# Patient Record
Sex: FEMALE | Race: WHITE | NOT HISPANIC OR LATINO | URBAN - METROPOLITAN AREA
[De-identification: names, ages, dates, MRNs, and addresses within clinical notes are randomized per-mention and may not be internally consistent; named-entity substitution may affect disease eponyms.]

---

## 2018-05-04 ENCOUNTER — EMERGENCY (EMERGENCY)
Facility: HOSPITAL | Age: 73
LOS: 1 days | Discharge: HOME | End: 2018-05-04
Attending: EMERGENCY MEDICINE | Admitting: EMERGENCY MEDICINE
Payer: MEDICARE

## 2018-05-04 VITALS
TEMPERATURE: 98 F | HEART RATE: 88 BPM | RESPIRATION RATE: 18 BRPM | SYSTOLIC BLOOD PRESSURE: 160 MMHG | OXYGEN SATURATION: 100 % | DIASTOLIC BLOOD PRESSURE: 89 MMHG

## 2018-05-04 PROCEDURE — 73140 X-RAY EXAM OF FINGER(S): CPT | Mod: 26,LT

## 2018-05-04 PROCEDURE — 99283 EMERGENCY DEPT VISIT LOW MDM: CPT

## 2018-05-04 RX ORDER — TETANUS TOXOID, REDUCED DIPHTHERIA TOXOID AND ACELLULAR PERTUSSIS VACCINE, ADSORBED 5; 2.5; 8; 8; 2.5 [IU]/.5ML; [IU]/.5ML; UG/.5ML; UG/.5ML; UG/.5ML
0.5 SUSPENSION INTRAMUSCULAR ONCE
Qty: 0 | Refills: 0 | Status: COMPLETED | OUTPATIENT
Start: 2018-05-04 | End: 2018-05-04

## 2018-05-04 RX ORDER — BACITRACIN ZINC 500 UNIT/G
1 OINTMENT IN PACKET (EA) TOPICAL ONCE
Qty: 0 | Refills: 0 | Status: COMPLETED | OUTPATIENT
Start: 2018-05-04 | End: 2018-05-04

## 2018-05-04 RX ADMIN — TETANUS TOXOID, REDUCED DIPHTHERIA TOXOID AND ACELLULAR PERTUSSIS VACCINE, ADSORBED 0.5 MILLILITER(S): 5; 2.5; 8; 8; 2.5 SUSPENSION INTRAMUSCULAR at 20:53

## 2018-05-04 RX ADMIN — Medication 1 APPLICATION(S): at 20:53

## 2018-05-04 NOTE — ED ADULT TRIAGE NOTE - CHIEF COMPLAINT QUOTE
left HAND injury, while moving the wood from her bed. was sent from urgent care for x-ray. FULL ROM,
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
## 2018-05-08 DIAGNOSIS — M79.645 PAIN IN LEFT FINGER(S): ICD-10-CM

## 2018-05-08 DIAGNOSIS — S67.193A CRUSHING INJURY OF LEFT MIDDLE FINGER, INITIAL ENCOUNTER: ICD-10-CM

## 2018-05-08 DIAGNOSIS — Z88.2 ALLERGY STATUS TO SULFONAMIDES: ICD-10-CM
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
## 2018-05-08 DIAGNOSIS — Z23 ENCOUNTER FOR IMMUNIZATION: ICD-10-CM

## 2018-12-28 NOTE — ED ADULT NURSE NOTE - NS ED NURSE LEVEL OF CONSCIOUSNESS MENTAL STATUS
Alert/Awake [Non-Contributory] : Non-contributory [Duration: ___ wks] : duration: [unfilled] weeks [Vaginal] : Vaginal [Normal?] : normal delivery [___ lbs.] : [unfilled] lbs [Was child in NICU?] : Child was not in NICU

## 2020-10-30 PROBLEM — Z00.00 ENCOUNTER FOR PREVENTIVE HEALTH EXAMINATION: Status: ACTIVE | Noted: 2020-10-30

## 2021-04-13 NOTE — ED PROVIDER NOTE - CPE EDP [facility] NORM
Last office visit: 2/9/2021  Next office visit: Visit date not found  Last refill: 9-3-2020  30 with 5 refills   Last labs: 1-    - - -

## 2021-10-04 ENCOUNTER — EMERGENCY (EMERGENCY)
Facility: HOSPITAL | Age: 76
LOS: 1 days | Discharge: HOME | End: 2021-10-04
Admitting: EMERGENCY MEDICINE
Payer: MEDICARE

## 2021-10-04 VITALS
RESPIRATION RATE: 15 BRPM | DIASTOLIC BLOOD PRESSURE: 84 MMHG | SYSTOLIC BLOOD PRESSURE: 163 MMHG | TEMPERATURE: 98 F | HEIGHT: 62 IN | WEIGHT: 126.99 LBS | HEART RATE: 79 BPM | OXYGEN SATURATION: 96 %

## 2021-10-04 DIAGNOSIS — R35.0 FREQUENCY OF MICTURITION: ICD-10-CM

## 2021-10-04 DIAGNOSIS — N39.0 URINARY TRACT INFECTION, SITE NOT SPECIFIED: ICD-10-CM

## 2021-10-04 DIAGNOSIS — Z88.2 ALLERGY STATUS TO SULFONAMIDES: ICD-10-CM

## 2021-10-04 DIAGNOSIS — E11.9 TYPE 2 DIABETES MELLITUS WITHOUT COMPLICATIONS: ICD-10-CM

## 2021-10-04 PROCEDURE — 99283 EMERGENCY DEPT VISIT LOW MDM: CPT

## 2021-10-04 NOTE — ED PROVIDER NOTE - NSFOLLOWUPINSTRUCTIONS_ED_ALL_ED_FT
1. Return to the ED if new or worsening symptoms  2. Follow up with your OBGYN as needed.    Urinary Tract Infection    A urinary tract infection (UTI) is an infection of any part of the urinary tract, which includes the kidneys, ureters, bladder, and urethra. Risk factors include ignoring your need to urinate, wiping back to front if female, being an uncircumcised male, and having diabetes or a weak immune system. Symptoms include frequent urination, pain or burning with urination, foul smelling urine, cloudy urine, pain in the lower abdomen, blood in the urine, and fever. If you were prescribed an antibiotic medicine, take it as told by your health care provider. Do not stop taking the antibiotic even if you start to feel better.    SEEK IMMEDIATE MEDICAL CARE IF YOU HAVE ANY OF THE FOLLOWING SYMPTOMS: severe back or abdominal pain, fever, inability to keep fluids or medicine down, dizziness/lightheadedness, or a change in mental status.

## 2021-10-04 NOTE — ED ADULT NURSE NOTE - NSIMPLEMENTINTERV_GEN_ALL_ED
Implemented All Universal Safety Interventions:  [place] to call system. Call bell, personal items and telephone within reach. Instruct patient to call for assistance. Room bathroom lighting operational. Non-slip footwear when patient is off stretcher. Physically safe environment: no spills, clutter or unnecessary equipment. Stretcher in lowest position, wheels locked, appropriate side rails in place.

## 2021-10-04 NOTE — ED PROVIDER NOTE - PATIENT PORTAL LINK FT
You can access the FollowMyHealth Patient Portal offered by [facility] by registering at the following website: [email or web address]. By joining [employer]’s FollowMyHealth portal, you will also be able to view your health information using other applications (apps) compatible with our system.

## 2021-10-04 NOTE — ED PROVIDER NOTE - CLINICAL SUMMARY MEDICAL DECISION MAKING FREE TEXT BOX
urinary frequency & urgency x 2 days, h/o UTIs. UA + Nitrite, pending culture. Pt on Macrobid PO from home, will f/u U.culture

## 2021-10-04 NOTE — ED PROVIDER NOTE - NSICDXPASTMEDICALHX_GEN_ALL_CORE_FT
PAST MEDICAL HISTORY:  No pertinent past medical history      PAST MEDICAL HISTORY:  Diabetes mellitus

## 2021-10-04 NOTE — ED PROVIDER NOTE - PROGRESS NOTE DETAILS
Patient has Macrobid antibiotics, exp 2022 and states the antibiotics work best for her. I advised her to take these antibiotics until the Urine culture results to assure she is getting proper coverage. patient understands and agrees to plan.

## 2021-10-04 NOTE — ED PROVIDER NOTE - OBJECTIVE STATEMENT
74 yo F h/o DM, presents with urinary frequency & urgency x 2 days. patient states it feels like her usual symptoms for UTI but wants to get checked out before she starts her Macrobid antibiotics (patient has pills already from her OBGYN).     (-) fevers, chill, nausea, vomiting, back pain, vaginal discharge or bleeding.

## 2022-01-07 PROBLEM — E11.9 TYPE 2 DIABETES MELLITUS WITHOUT COMPLICATIONS: Chronic | Status: ACTIVE | Noted: 2021-10-04

## 2022-02-02 ENCOUNTER — APPOINTMENT (OUTPATIENT)
Dept: ORTHOPEDIC SURGERY | Facility: CLINIC | Age: 77
End: 2022-02-02
Payer: MEDICARE

## 2022-02-02 DIAGNOSIS — Z78.9 OTHER SPECIFIED HEALTH STATUS: ICD-10-CM

## 2022-02-02 DIAGNOSIS — Z87.39 PERSONAL HISTORY OF OTHER DISEASES OF THE MUSCULOSKELETAL SYSTEM AND CONNECTIVE TISSUE: ICD-10-CM

## 2022-02-02 DIAGNOSIS — E11.9 TYPE 2 DIABETES MELLITUS W/OUT COMPLICATIONS: ICD-10-CM

## 2022-02-02 PROCEDURE — 99204 OFFICE O/P NEW MOD 45 MIN: CPT

## 2022-02-04 PROBLEM — Z87.39 HISTORY OF OSTEOPENIA: Status: RESOLVED | Noted: 2022-02-04 | Resolved: 2022-02-04

## 2022-02-04 PROBLEM — Z78.9 NON-SMOKER: Status: ACTIVE | Noted: 2022-02-04

## 2022-02-04 RX ORDER — RISEDRONATE SODIUM 30.1; 4.9 MG/1; MG/1
35 TABLET, FILM COATED ORAL
Refills: 0 | Status: ACTIVE | COMMUNITY

## 2022-02-04 RX ORDER — LOSARTAN POTASSIUM 25 MG/1
25 TABLET, FILM COATED ORAL
Refills: 0 | Status: ACTIVE | COMMUNITY

## 2022-02-04 RX ORDER — ESTRADIOL 0.1 MG/G
0.1 CREAM VAGINAL
Refills: 0 | Status: ACTIVE | COMMUNITY

## 2022-02-04 RX ORDER — METFORMIN HYDROCHLORIDE 500 MG/1
500 TABLET, COATED ORAL
Refills: 0 | Status: ACTIVE | COMMUNITY

## 2022-02-04 RX ORDER — INSULIN GLARGINE 100 [IU]/ML
INJECTION, SOLUTION SUBCUTANEOUS
Refills: 0 | Status: ACTIVE | COMMUNITY

## 2022-02-04 RX ORDER — INSULIN LISPRO 100 [IU]/ML
INJECTION, SOLUTION INTRAVENOUS; SUBCUTANEOUS
Refills: 0 | Status: ACTIVE | COMMUNITY

## 2022-02-04 RX ORDER — NAPROXEN 500 MG/1
500 TABLET ORAL
Refills: 0 | Status: ACTIVE | COMMUNITY

## 2022-02-04 RX ORDER — SIMVASTATIN 20 MG/1
20 TABLET, FILM COATED ORAL
Refills: 0 | Status: ACTIVE | COMMUNITY

## 2022-02-08 NOTE — PHYSICAL EXAM
[de-identified] : Physical Exam:\par \par General: patient is well developed, well nourished, in no acute distress, alert and oriented x3.\par \par Mood and affect: normal\par \par Respiratory: no respiratory distress noted\par \par Skin: no scars over spine, skin intact, no erythema, increased warmth\par \par Alignment: The spine is well compensated in the coronal and sagittal plane\par \par Gait: The patient is able to toe walk and heel walk without difficulty. The patient is able to tandem gait without difficulty.\par \par Palpation: no tenderness to palpation spine or paraspinal region\par \par Range of motion: lumbar spine ROM is full\par \par Neurological Exam:\par Motor: Manual muscle testing in the upper and lower extremities is 5 out of 5 in all muscle groups. There is no evidence of muscular atrophy in the upper extremities\par Sensory: Sensation to light touch is grossly intact in the upper and lower extremities\par \par Reflexes: DTR are present and symmetric throughout, no clonus, plantar responses are flexor\par \par Hip exam: No pain with internal or external rotation of hips bilaterally\par \par Special tests: Straight leg raise test negative. Cross straight leg test negative. OSKAR test negative.\par \par Vascular: Examination of the peripheral vascular system demonstrates no evidence of congestion or edema. no lymphedema bilateral lower extremities, pulses are present and symmetric in both lower extremities.  [de-identified] : 1/4/22 MRI: severe stenosis central and lateral recess at L4-5, severe neuroforaminal stenosis bilaterally\par \par 1/4/22 CT: L4-5 grade 2 spondylolisthesis with vacuum phenomenon at L4-5 disc, severe disc height loss, pars are thin though appear intact, severe stenosis seen again at L4-5 level in the canal and in the foramen\par \par 1/4/22 quantitative CT lumbar spine: average score of 86.7 which correlates to T score of -3.14, the  range is indicative of osteopenia as per QCT though the T score given suggested that she may be borderline osteoporotic\par \par 1/3/22 lumbar flexion extension: grade 2 spondylolisthesis at L4-5, severe disc height loss, does not appear to be any significant translation between flexion extension though the patient is not seem to be changing her spine shape much in these images\par \par 1/3/22 XR full spine: mild scoliosis, well aligned globally, grade 2 spondylolisthesis of L4-5 with severe height loss, overall well maintained lordosis and well maintained global sagittal alignment, other discs appear normal

## 2022-02-08 NOTE — HISTORY OF PRESENT ILLNESS
[de-identified] : Ms. THIBODEAUX is a very pleasant 76 year old female who complains of bilateral lower extremity radicular pain. She sleeps in a loft bed and climbs down a ladder when she wakes up. She can hardly walk secondary to lower extremity pain after waking. Pain improves throughout the day. Also reports lower extremity paresthesias in the mornings. She takes Naproxen 500 mg BID with improvement. She had an ALONSO which gave her relief for 1.5 months. h/o DM type 2, she reports that it is not well controlled and her A1C is around 8. Denies any other medical issues. \par \par The patient reports no loss of hand dexterity.\par The patient states there is no loss of balance when walking.\par There is no sensory loss in the arms or legs.\par The patient reports no difficulty with urination.\par \par The patient reports no history of previous spine surgery.\par \par The patient has no history of unexpected weight loss, no history of active cancer, no history of bladder or bowel dysfunction, no night pain, no fever or chills. \par \par The past medical history, surgical history, family history, allergies, medications, review of systems, family history and social history were reviewed and noncontributory.

## 2022-02-08 NOTE — DISCUSSION/SUMMARY
[de-identified] : Diagnosis: grade 2 spondylolisthesis with severe central and lateral recess and foraminal stenosis with lumbar radiculopathy and neurogenic claudication\par \par Plan:\par Discussed my findings with the patient. Ms. Mayo has been suffering from chronic lower extremity radicular pain. Discussed findings on imaging corresponds to the symptoms she has been experiencing. She has failed conservative management, including oral medication, physical therapy, and steroid injections. Discussed surgical intervention would involve an L4-5 laminectomy, posterior fusion with instrumentation, tlif. However, patient not currently a candidate for elective spine surgery due to her A1c of 8.0. Explained importance of tighter glucose control and perioperative medical risks and increased risks of poor wound healing and infection. FU in 6-8 weeks for repeat exam and to check on her glucose level and symptoms, sooner if there is an issue. Answered all questions.

## 2022-02-08 NOTE — ADDENDUM
[FreeTextEntry1] : Documented by Maggie Short acting as scribe for Dr. Baker on 02/02/2022 \par \par All Medical record entries made by the Scribe were at my, Dr. Baker's, direction and personally dictated by me on 02/02/2022. I have reviewed the chart and agree that the record accurately reflects my personal performance of the history, physical exam, assessment and plan. I have also personally directed, reviewed, and agreed with the discharge instructions.

## 2022-03-16 ENCOUNTER — APPOINTMENT (OUTPATIENT)
Dept: ORTHOPEDIC SURGERY | Facility: CLINIC | Age: 77
End: 2022-03-16
Payer: MEDICARE

## 2022-03-16 VITALS
SYSTOLIC BLOOD PRESSURE: 109 MMHG | DIASTOLIC BLOOD PRESSURE: 67 MMHG | HEART RATE: 87 BPM | OXYGEN SATURATION: 97 % | WEIGHT: 126 LBS | HEIGHT: 62 IN | BODY MASS INDEX: 23.19 KG/M2

## 2022-03-16 DIAGNOSIS — M48.061 SPINAL STENOSIS, LUMBAR REGION WITHOUT NEUROGENIC CLAUDICATION: ICD-10-CM

## 2022-03-16 PROCEDURE — 99214 OFFICE O/P EST MOD 30 MIN: CPT

## 2022-03-17 PROBLEM — M48.061 LUMBAR STENOSIS: Status: ACTIVE | Noted: 2022-02-04

## 2022-03-17 NOTE — DISCUSSION/SUMMARY
[de-identified] : Diagnosis: grade 2 spondylolisthesis L4-5 with spinal stenosis\par \par Discussed my findings with the patient. At this point Kassy is not having any pain. She is taking 250 mg Naproxen BID. She is not having any weakness or numbness. I have advised Kassy to ween off her Naproxen and see how she feels. She will continue to work on her sugar control. If her pain returns as she weens her Naproxen then we will plan for surgery. However at this time with her being asymptomatic I would not recommend a major surgical intervention at this time. All questions answered.

## 2022-03-17 NOTE — HISTORY OF PRESENT ILLNESS
[de-identified] : Follow up 3/16/22: Patient presents today for a follow up. She denies any changes to her symptoms since she was last seen. She has been taking Naproxen BID with improvement to her pain. She has very little pain currently. Her last A1C was 7.9. Her numbers are improving. \par \par Initial 2/2/22: Ms. THIBODEAUX is a very pleasant 76 year old female who complains of bilateral lower extremity radicular pain. She sleeps in a loft bed and climbs down a ladder when she wakes up. She can hardly walk secondary to lower extremity pain after waking. Pain improves throughout the day. Also reports lower extremity paresthesias in the mornings. She takes Naproxen 500 mg BID with improvement. She had an ALONSO which gave her relief for 1.5 months. h/o DM type 2, she reports that it is not well controlled and her A1C is around 8. Denies any other medical issues. \par \par The patient reports no loss of hand dexterity.\par The patient states there is no loss of balance when walking.\par There is no sensory loss in the arms or legs.\par The patient reports no difficulty with urination.\par \par The patient reports no history of previous spine surgery.\par \par The patient has no history of unexpected weight loss, no history of active cancer, no history of bladder or bowel dysfunction, no night pain, no fever or chills. \par \par The past medical history, surgical history, family history, allergies, medications, review of systems, family history and social history were reviewed and noncontributory.

## 2022-03-17 NOTE — ADDENDUM
[FreeTextEntry1] : Documented by Maggie Short acting as scribe for Dr. Baker on 03/16/2022 \par \par All Medical record entries made by the Scribe were at my, Dr. Baker's, direction and personally dictated by me on 03/16/2022. I have reviewed the chart and agree that the record accurately reflects my personal performance of the history, physical exam, assessment and plan. I have also personally directed, reviewed, and agreed with the discharge instructions.

## 2022-03-17 NOTE — PHYSICAL EXAM
[de-identified] : Physical Exam:  General: patient is well developed, well nourished, in no acute distress, alert and oriented x3.  Mood and affect: normal  Respiratory: no respiratory distress noted  Skin: no scars over spine, skin intact, no erythema, increased warmth  Alignment: The spine is well compensated in the coronal and sagittal plane  Gait: The patient is able to toe walk and heel walk without difficulty. The patient is able to tandem gait without difficulty.  Palpation: no tenderness to palpation spine or paraspinal region  Range of motion: lumbar spine ROM is full  Neurological Exam: Motor: Manual muscle testing in the upper and lower extremities is 5 out of 5 in all muscle groups. There is no evidence of muscular atrophy in the upper extremities Sensory: Sensation to light touch is grossly intact in the upper and lower extremities  Reflexes: DTR are present and symmetric throughout, no clonus, plantar responses are flexor  Hip exam: No pain with internal or external rotation of hips bilaterally  Special tests: Straight leg raise test negative. Cross straight leg test negative. OSKAR test negative.  Vascular: Examination of the peripheral vascular system demonstrates no evidence of congestion or edema. no lymphedema bilateral lower extremities, pulses are present and symmetric in both lower extremities. [de-identified] : 1/4/22 MRI: severe stenosis central and lateral recess at L4-5, severe neuroforaminal stenosis bilaterally\par \par 1/4/22 CT: L4-5 grade 2 spondylolisthesis with vacuum phenomenon at L4-5 disc, severe disc height loss, pars are thin though appear intact, severe stenosis seen again at L4-5 level in the canal and in the foramen\par \par 1/4/22 quantitative CT lumbar spine: average score of 86.7 which correlates to T score of -3.14, the  range is indicative of osteopenia as per QCT though the T score given suggested that she may be borderline osteoporotic\par \par 1/3/22 lumbar flexion extension: grade 2 spondylolisthesis at L4-5, severe disc height loss, does not appear to be any significant translation between flexion extension though the patient is not seem to be changing her spine shape much in these images\par \par 1/3/22 XR full spine: mild scoliosis, well aligned globally, grade 2 spondylolisthesis of L4-5 with severe height loss, overall well maintained lordosis and well maintained global sagittal alignment, other discs appear normal

## 2022-04-12 ENCOUNTER — APPOINTMENT (OUTPATIENT)
Dept: ORTHOPEDIC SURGERY | Facility: CLINIC | Age: 77
End: 2022-04-12
Payer: MEDICARE

## 2022-04-12 DIAGNOSIS — M43.16 SPONDYLOLISTHESIS, LUMBAR REGION: ICD-10-CM

## 2022-04-12 DIAGNOSIS — M54.16 RADICULOPATHY, LUMBAR REGION: ICD-10-CM

## 2022-04-12 PROCEDURE — 99443: CPT | Mod: 95

## 2022-04-12 NOTE — REASON FOR VISIT
[Home] : at home, [unfilled] , at the time of the visit. [Medical Office: ([facility])___] : at the medical office located in  [Verbal consent obtained from patient] : the patient, [unfilled] [Follow-Up Visit] : a follow-up visit for

## 2022-04-15 NOTE — HISTORY OF PRESENT ILLNESS
[de-identified] : Follow up 4/12/22: Patient presents for surgical discussion. Patient reports after 2-3 days off naproxen, low back pain increased to 7-8/10 severity. Went back on naproxen and pain now significantly improved. No new neurologic symptoms.

## 2022-04-15 NOTE — DISCUSSION/SUMMARY
[de-identified] : Discussed my findings with the patient. As patients symptoms are controlled with NSAID, would defer surgery at this time. Patient has osteoporosis on DEXA, discussed the importance of optimizing bone health and consultation with endocrinologist. If proceeded with surgical intervention, would likely recommend 3 months of forteo preoperatively. Discussed surgery would involve L4/L5 laminectomy, posterior spinal fusion with instrumentation, possible tlif, possible cemented screws. Patient inquired about TOPS procedure. Discussed she is not a candidate for TOPS due to osteoporosis and grade 2 anterolisthesis. Patient can continue naproxen if ok with PCP (should monitor BP and kidney function). Patient will follow up with me as needed if pain worsens and would like to consider surgical intervention. All questions answered.\par \par

## 2022-04-27 ENCOUNTER — APPOINTMENT (OUTPATIENT)
Dept: ORTHOPEDIC SURGERY | Facility: CLINIC | Age: 77
End: 2022-04-27

## 2023-02-15 ENCOUNTER — EMERGENCY (EMERGENCY)
Facility: HOSPITAL | Age: 78
LOS: 1 days | Discharge: HOME | End: 2023-02-15
Attending: EMERGENCY MEDICINE | Admitting: EMERGENCY MEDICINE
Payer: MEDICARE

## 2023-02-15 VITALS
DIASTOLIC BLOOD PRESSURE: 76 MMHG | HEIGHT: 63 IN | WEIGHT: 163.14 LBS | HEART RATE: 81 BPM | OXYGEN SATURATION: 95 % | RESPIRATION RATE: 16 BRPM | SYSTOLIC BLOOD PRESSURE: 161 MMHG

## 2023-02-15 VITALS
RESPIRATION RATE: 17 BRPM | TEMPERATURE: 98 F | DIASTOLIC BLOOD PRESSURE: 71 MMHG | HEART RATE: 74 BPM | OXYGEN SATURATION: 96 % | SYSTOLIC BLOOD PRESSURE: 147 MMHG

## 2023-02-15 DIAGNOSIS — Z88.2 ALLERGY STATUS TO SULFONAMIDES: ICD-10-CM

## 2023-02-15 DIAGNOSIS — Z79.4 LONG TERM (CURRENT) USE OF INSULIN: ICD-10-CM

## 2023-02-15 DIAGNOSIS — R41.82 ALTERED MENTAL STATUS, UNSPECIFIED: ICD-10-CM

## 2023-02-15 DIAGNOSIS — E11.649 TYPE 2 DIABETES MELLITUS WITH HYPOGLYCEMIA WITHOUT COMA: ICD-10-CM

## 2023-02-15 LAB
ALBUMIN SERPL ELPH-MCNC: 4.2 G/DL — SIGNIFICANT CHANGE UP (ref 3.4–5)
ALP SERPL-CCNC: 80 U/L — SIGNIFICANT CHANGE UP (ref 40–120)
ALT FLD-CCNC: 24 U/L — SIGNIFICANT CHANGE UP (ref 12–42)
ANION GAP SERPL CALC-SCNC: 11 MMOL/L — SIGNIFICANT CHANGE UP (ref 9–16)
AST SERPL-CCNC: 26 U/L — SIGNIFICANT CHANGE UP (ref 15–37)
BILIRUB SERPL-MCNC: 0.3 MG/DL — SIGNIFICANT CHANGE UP (ref 0.2–1.2)
BUN SERPL-MCNC: 25 MG/DL — HIGH (ref 7–23)
CALCIUM SERPL-MCNC: 9.4 MG/DL — SIGNIFICANT CHANGE UP (ref 8.5–10.5)
CHLORIDE SERPL-SCNC: 102 MMOL/L — SIGNIFICANT CHANGE UP (ref 96–108)
CO2 SERPL-SCNC: 26 MMOL/L — SIGNIFICANT CHANGE UP (ref 22–31)
CREAT SERPL-MCNC: 0.82 MG/DL — SIGNIFICANT CHANGE UP (ref 0.5–1.3)
EGFR: 74 ML/MIN/1.73M2 — SIGNIFICANT CHANGE UP
GLUCOSE BLDC GLUCOMTR-MCNC: 185 MG/DL — HIGH (ref 70–99)
GLUCOSE SERPL-MCNC: 148 MG/DL — HIGH (ref 70–99)
HCT VFR BLD CALC: 38.7 % — SIGNIFICANT CHANGE UP (ref 34.5–45)
HGB BLD-MCNC: 12.3 G/DL — SIGNIFICANT CHANGE UP (ref 11.5–15.5)
MCHC RBC-ENTMCNC: 29.9 PG — SIGNIFICANT CHANGE UP (ref 27–34)
MCHC RBC-ENTMCNC: 31.8 GM/DL — LOW (ref 32–36)
MCV RBC AUTO: 93.9 FL — SIGNIFICANT CHANGE UP (ref 80–100)
NRBC # BLD: 0 /100 WBCS — SIGNIFICANT CHANGE UP (ref 0–0)
PLATELET # BLD AUTO: 244 K/UL — SIGNIFICANT CHANGE UP (ref 150–400)
POTASSIUM SERPL-MCNC: 4.4 MMOL/L — SIGNIFICANT CHANGE UP (ref 3.5–5.3)
POTASSIUM SERPL-SCNC: 4.4 MMOL/L — SIGNIFICANT CHANGE UP (ref 3.5–5.3)
PROT SERPL-MCNC: 7.6 G/DL — SIGNIFICANT CHANGE UP (ref 6.4–8.2)
RBC # BLD: 4.12 M/UL — SIGNIFICANT CHANGE UP (ref 3.8–5.2)
RBC # FLD: 12.6 % — SIGNIFICANT CHANGE UP (ref 10.3–14.5)
SODIUM SERPL-SCNC: 139 MMOL/L — SIGNIFICANT CHANGE UP (ref 132–145)
WBC # BLD: 15.88 K/UL — HIGH (ref 3.8–10.5)
WBC # FLD AUTO: 15.88 K/UL — HIGH (ref 3.8–10.5)

## 2023-02-15 PROCEDURE — 99284 EMERGENCY DEPT VISIT MOD MDM: CPT

## 2023-02-15 NOTE — ED PROVIDER NOTE - PATIENT PORTAL LINK FT
You can access the FollowMyHealth Patient Portal offered by [facility] by registering at the following website: [email or web address]. By joining [employer]’s FollowMyHealth portal, you will also be able to view your health information using other applications (apps) compatible with our system.

## 2023-02-15 NOTE — ED PROVIDER NOTE - CARE PLAN
1 Principal Discharge DX:	Diabetes mellitus with hypoglycemia, with long-term current use of insulin

## 2023-02-15 NOTE — ED PROVIDER NOTE - CLINICAL SUMMARY MEDICAL DECISION MAKING FREE TEXT BOX
77 year old female p/w hypoglycemia secondary to insulin use and no food intake.  Patient ate her bean soup and was given cookies. Serial FSG wnl. Patient observed in ED with no acute events. Educated to importance of eat food after using insulin. Patient will follow up with her PMD within a week. Discharge and return to ED instructions given. 77 year old female p/w hypoglycemia secondary to insulin use and no food intake.  Patient ate her bean soup and was given cookies. Serial FSG wnl. Patient observed in ED with no acute events. Repeat FSG is 188. Educated to importance of eat food after using insulin. Patient will follow up with her PMD within a week. Discharge and return to ED instructions given.

## 2023-02-15 NOTE — ED ADULT TRIAGE NOTE - CHIEF COMPLAINT QUOTE
BIBA from [facility] for unconscious/AMS; found blood sugar to be 43 on scene; 18g IV established and given 1 amp d50 PTA; alert and oriented x3

## 2023-02-15 NOTE — ED PROVIDER NOTE - NSFOLLOWUPINSTRUCTIONS_ED_ALL_ED_FT
Hypoglycemia in a Person with Diabetes    WHAT YOU NEED TO KNOW:    What is hypoglycemia? Hypoglycemia is a serious condition that happens when your blood glucose (sugar) level drops too low. The blood sugar level is usually too high in a person with diabetes, but the level can also drop too low. It is important to follow your diabetes management plan to keep your blood sugar level steady.    What increases my risk for hypoglycemia?   •Binge eating, eating large amounts of carbohydrates in processed foods such as potato chips      •A missed meal, or a meal eaten later than usual      •Vomiting      •Certain medicines, including insulin or other diabetes medicine      •More exercise than usual, without extra food      •Alcohol use      •Pregnancy, older age      •Decreased liver or kidney function      •Not knowing your symptoms are symptoms of hypoglycemia      What are the signs and symptoms of hypoglycemia?   •Headache, hunger, or shakiness      •Trouble thinking or moodiness      •Sweating, or a pounding heartbeat      •Forgetfulness, confusion, or double vision      •Weakness or trouble walking      •Numbness and tingling around your mouth      •Seizures, coma, or loss of consciousness      How do I manage hypoglycemia?   •Check your blood sugar level right away if you have symptoms of hypoglycemia. Hypoglycemia usually happens when your blood sugar level is 70 mg/dL or below. Ask your diabetes care team provider what blood sugar level is too low for you.      •If your blood sugar level is too low, eat or drink 15 grams of fast-acting carbohydrate. Examples of this amount of fast-acting carbohydrate are 4 ounces (½ cup) of fruit juice or 4 ounces of regular soda. Other examples are 2 tablespoons of raisins or 1 tube of glucose gel.  Ways to Raise Your Blood Sugar     Check your blood sugar level 15 minutes later. Sit still as you wait. If the level is still low (less than 100 mg/dL), have another 15 grams of carbohydrate. When the level returns to 100 mg/dL, eat a meal if it is time. If your meal time is more than 1 hour away, eat a snack. The snack should contain carbohydrates, such as the following:?3/4 cup of cereal      ?1 cup of skim or low fat milk      ?6 soda crackers      ?1/2 of a turkey sandwich      ?15 fat-free chips  This will help prevent another drop in blood sugar. Always carefully follow your provider's instructions on how to treat low blood sugar levels.    •Always carry a source of fast-acting carbohydrate. If you have symptoms of hypoglycemia and you do not have a blood glucose meter, have a source of fast-acting carbohydrate anyway. Avoid carbohydrate foods that are high in fat. The fat content may make the carbohydrate take longer to increase your blood sugar level. Ask your provider if you should carry a glucagon kit. Glucagon is a medicine that is injected when you develop severe hypoglycemia and become unconscious. Check the expiration date every month and replace it before it expires.      •Teach others how to help you if you have symptoms of hypoglycemia. Tell them about the symptoms of hypoglycemia. Ask them to give you a source of fast-acting carbohydrate if you cannot get it yourself. Ask them to give you a glucagon injection if you have signs of hypoglycemia and you become unconscious or have a seizure. Ask them to call the local emergency number (911 in the ). This is an emergency. Tell them never to try to make you swallow anything if you faint or have a seizure.      •Wear medical alert jewelry or carry a card that says you have diabetes. Ask where to get these items.  Medical Alert Jewelry           How do I prevent hypoglycemia?   •Take diabetes medicine as directed. Take your medicine at the right time and in the right amount. Do not double the amount of medicine you take unless instructed by your diabetes care team provider. You may need oral diabetes medicine, insulin, or both to help control your blood sugar levels. Your healthcare provider will teach you how and when to take oral diabetes medicine. You will also be taught about side effects oral diabetes medicine can cause. Insulin may be added if oral diabetes medicine becomes less effective over time. Insulin may be injected, or given through a pump or pen. You and your care team will discuss which method is best for you.?An insulin pump is an implanted device that gives your insulin 24 hours a day. An insulin pump prevents the need for multiple insulin injections in a day.             ?An insulin pen is a device prefilled with the right amount of insulin.  Insulin Pen            •Eat meals and snacks as directed. Talk to your dietitian or provider about a meal plan that is right for you. Do not skip meals.  Plate Method           •Check your blood sugar level as directed. Ask your provider what your blood sugar levels should be before and after you eat. Ask when and how often to check your blood sugar level. You may need to check a drop of blood in a glucose test machine. You may need to check at least 3 times each day. Record your blood sugar level results and take the record with you when you see your care team. They may use it to make changes to your medicine, food, or exercise schedules. Your care team provider may recommend a continuous glucose monitor (CGM). A CGM is a device that is worn at all times. The CGM checks your blood sugar every 5 minutes. It sends results to an electronic device such as a smart phone.  How to check your blood sugar       Continuous Glucose Monitoring            •Check your blood sugar level before you exercise. Physical activity, such as exercise, can decrease your blood sugar level. If your blood sugar level is less than 100 mg/dL, have a carbohydrate snack. Examples are 4 to 6 crackers, ½ banana, 8 ounces (1 cup) of nonfat or 1% milk, or 4 ounces (½ cup) of juice. If you will be active for more than 1 hour, you may need to check your blood sugar level every 30 minutes. Your provider may also recommend that you check your blood sugar level after your activity.      •Know the risks if you choose to drink alcohol. Alcohol can cause your blood sugar levels to be low if you use insulin. Alcohol can cause high blood sugar levels and weight gain if you drink too much. Women 21 years or older and men 65 years or older should limit alcohol to 1 drink a day. Men aged 21 to 64 years should limit alcohol to 2 drinks a day. A drink of alcohol is 12 ounces of beer, 5 ounces of wine, or 1½ ounces of liquor.      Have someone call your local emergency number (911 in the [place]) if:   •You have a seizure or pass out.      •Your blood sugar is less than 50 mg/dL and does not respond to treatment.      •You feel you are going to pass out.      •You have trouble thinking clearly.      When should I call my doctor or diabetes care team provider?   •You have had symptoms of low blood sugar several times.      •You have questions about the amount of insulin or diabetes medicine you are taking.      •You have questions or concerns about your condition or care.      CARE AGREEMENT:    You have the right to help plan your care. Learn about your health condition and how it may be treated. Discuss treatment options with your healthcare providers to decide what care you want to receive. You always have the right to refuse treatment.

## 2023-05-10 ENCOUNTER — EMERGENCY (EMERGENCY)
Facility: HOSPITAL | Age: 78
LOS: 1 days | Discharge: HOME | End: 2023-05-10
Attending: EMERGENCY MEDICINE | Admitting: EMERGENCY MEDICINE
Payer: MEDICARE

## 2023-05-10 VITALS
HEART RATE: 83 BPM | OXYGEN SATURATION: 98 % | RESPIRATION RATE: 16 BRPM | DIASTOLIC BLOOD PRESSURE: 82 MMHG | TEMPERATURE: 98 F | SYSTOLIC BLOOD PRESSURE: 175 MMHG

## 2023-05-10 VITALS
HEART RATE: 76 BPM | DIASTOLIC BLOOD PRESSURE: 70 MMHG | TEMPERATURE: 98 F | SYSTOLIC BLOOD PRESSURE: 126 MMHG | OXYGEN SATURATION: 99 % | RESPIRATION RATE: 16 BRPM

## 2023-05-10 DIAGNOSIS — E11.649 TYPE 2 DIABETES MELLITUS WITH HYPOGLYCEMIA WITHOUT COMA: ICD-10-CM

## 2023-05-10 DIAGNOSIS — I10 ESSENTIAL (PRIMARY) HYPERTENSION: ICD-10-CM

## 2023-05-10 DIAGNOSIS — Z03.89 ENCOUNTER FOR OBSERVATION FOR OTHER SUSPECTED DISEASES AND CONDITIONS RULED OUT: ICD-10-CM

## 2023-05-10 DIAGNOSIS — Z79.4 LONG TERM (CURRENT) USE OF INSULIN: ICD-10-CM

## 2023-05-10 LAB
ALBUMIN SERPL ELPH-MCNC: 3.5 G/DL — SIGNIFICANT CHANGE UP (ref 3.4–5)
ALP SERPL-CCNC: 87 U/L — SIGNIFICANT CHANGE UP (ref 40–120)
ALT FLD-CCNC: 18 U/L — SIGNIFICANT CHANGE UP (ref 12–42)
ANION GAP SERPL CALC-SCNC: 5 MMOL/L — LOW (ref 9–16)
APPEARANCE UR: ABNORMAL
AST SERPL-CCNC: 23 U/L — SIGNIFICANT CHANGE UP (ref 15–37)
BACTERIA # UR AUTO: ABNORMAL /HPF
BASOPHILS # BLD AUTO: 0.08 K/UL — SIGNIFICANT CHANGE UP (ref 0–0.2)
BASOPHILS NFR BLD AUTO: 0.4 % — SIGNIFICANT CHANGE UP (ref 0–2)
BILIRUB SERPL-MCNC: 0.2 MG/DL — SIGNIFICANT CHANGE UP (ref 0.2–1.2)
BILIRUB UR-MCNC: NEGATIVE — SIGNIFICANT CHANGE UP
BUN SERPL-MCNC: 23 MG/DL — SIGNIFICANT CHANGE UP (ref 7–23)
CALCIUM SERPL-MCNC: 9.4 MG/DL — SIGNIFICANT CHANGE UP (ref 8.5–10.5)
CHLORIDE SERPL-SCNC: 105 MMOL/L — SIGNIFICANT CHANGE UP (ref 96–108)
CO2 SERPL-SCNC: 28 MMOL/L — SIGNIFICANT CHANGE UP (ref 22–31)
COLOR SPEC: YELLOW — SIGNIFICANT CHANGE UP
COMMENT - URINE: SIGNIFICANT CHANGE UP
CREAT SERPL-MCNC: 0.78 MG/DL — SIGNIFICANT CHANGE UP (ref 0.5–1.3)
DIFF PNL FLD: NEGATIVE — SIGNIFICANT CHANGE UP
EGFR: 78 ML/MIN/1.73M2 — SIGNIFICANT CHANGE UP
EOSINOPHIL # BLD AUTO: 0.11 K/UL — SIGNIFICANT CHANGE UP (ref 0–0.5)
EOSINOPHIL NFR BLD AUTO: 0.6 % — SIGNIFICANT CHANGE UP (ref 0–6)
EPI CELLS # UR: ABNORMAL /HPF (ref 0–5)
ETHANOL SERPL-MCNC: <3 MG/DL — SIGNIFICANT CHANGE UP
GLUCOSE BLDC GLUCOMTR-MCNC: 177 MG/DL — HIGH (ref 70–99)
GLUCOSE SERPL-MCNC: 176 MG/DL — HIGH (ref 70–99)
GLUCOSE UR QL: NEGATIVE — SIGNIFICANT CHANGE UP
HCT VFR BLD CALC: 36.2 % — SIGNIFICANT CHANGE UP (ref 34.5–45)
HGB BLD-MCNC: 11.5 G/DL — SIGNIFICANT CHANGE UP (ref 11.5–15.5)
HYALINE CASTS # UR AUTO: SIGNIFICANT CHANGE UP /LPF (ref 0–2)
IMM GRANULOCYTES NFR BLD AUTO: 0.5 % — SIGNIFICANT CHANGE UP (ref 0–0.9)
KETONES UR-MCNC: ABNORMAL MG/DL
LACTATE SERPL-SCNC: 1.4 MMOL/L — SIGNIFICANT CHANGE UP (ref 0.4–2)
LEUKOCYTE ESTERASE UR-ACNC: ABNORMAL
LYMPHOCYTES # BLD AUTO: 1.18 K/UL — SIGNIFICANT CHANGE UP (ref 1–3.3)
LYMPHOCYTES # BLD AUTO: 6.3 % — LOW (ref 13–44)
MAGNESIUM SERPL-MCNC: 2 MG/DL — SIGNIFICANT CHANGE UP (ref 1.6–2.6)
MCHC RBC-ENTMCNC: 29.9 PG — SIGNIFICANT CHANGE UP (ref 27–34)
MCHC RBC-ENTMCNC: 31.8 GM/DL — LOW (ref 32–36)
MCV RBC AUTO: 94 FL — SIGNIFICANT CHANGE UP (ref 80–100)
MONOCYTES # BLD AUTO: 1 K/UL — HIGH (ref 0–0.9)
MONOCYTES NFR BLD AUTO: 5.3 % — SIGNIFICANT CHANGE UP (ref 2–14)
NEUTROPHILS # BLD AUTO: 16.26 K/UL — HIGH (ref 1.8–7.4)
NEUTROPHILS NFR BLD AUTO: 86.9 % — HIGH (ref 43–77)
NITRITE UR-MCNC: NEGATIVE — SIGNIFICANT CHANGE UP
NRBC # BLD: 0 /100 WBCS — SIGNIFICANT CHANGE UP (ref 0–0)
PH UR: 6.5 — SIGNIFICANT CHANGE UP (ref 5–8)
PLATELET # BLD AUTO: 346 K/UL — SIGNIFICANT CHANGE UP (ref 150–400)
POTASSIUM SERPL-MCNC: 4.3 MMOL/L — SIGNIFICANT CHANGE UP (ref 3.5–5.3)
POTASSIUM SERPL-SCNC: 4.3 MMOL/L — SIGNIFICANT CHANGE UP (ref 3.5–5.3)
PROT SERPL-MCNC: 6.7 G/DL — SIGNIFICANT CHANGE UP (ref 6.4–8.2)
PROT UR-MCNC: NEGATIVE MG/DL — SIGNIFICANT CHANGE UP
RBC # BLD: 3.85 M/UL — SIGNIFICANT CHANGE UP (ref 3.8–5.2)
RBC # FLD: 12.8 % — SIGNIFICANT CHANGE UP (ref 10.3–14.5)
RBC CASTS # UR COMP ASSIST: < 5 /HPF — SIGNIFICANT CHANGE UP
SODIUM SERPL-SCNC: 138 MMOL/L — SIGNIFICANT CHANGE UP (ref 132–145)
SP GR SPEC: 1.02 — SIGNIFICANT CHANGE UP (ref 1–1.03)
UROBILINOGEN FLD QL: 0.2 E.U./DL — SIGNIFICANT CHANGE UP
WBC # BLD: 18.72 K/UL — HIGH (ref 3.8–10.5)
WBC # FLD AUTO: 18.72 K/UL — HIGH (ref 3.8–10.5)
WBC UR QL: > 10 /HPF

## 2023-05-10 PROCEDURE — 99284 EMERGENCY DEPT VISIT MOD MDM: CPT

## 2023-05-10 RX ORDER — CEFTRIAXONE 500 MG/1
1000 INJECTION, POWDER, FOR SOLUTION INTRAMUSCULAR; INTRAVENOUS ONCE
Refills: 0 | Status: COMPLETED | OUTPATIENT
Start: 2023-05-10 | End: 2023-05-10

## 2023-05-10 RX ORDER — SODIUM CHLORIDE 9 MG/ML
1000 INJECTION INTRAMUSCULAR; INTRAVENOUS; SUBCUTANEOUS ONCE
Refills: 0 | Status: COMPLETED | OUTPATIENT
Start: 2023-05-10 | End: 2023-05-10

## 2023-05-10 RX ORDER — CEFUROXIME AXETIL 250 MG
1 TABLET ORAL
Qty: 14 | Refills: 0
Start: 2023-05-10 | End: 2023-05-16

## 2023-05-10 RX ADMIN — SODIUM CHLORIDE 1000 MILLILITER(S): 9 INJECTION INTRAMUSCULAR; INTRAVENOUS; SUBCUTANEOUS at 04:03

## 2023-05-10 RX ADMIN — CEFTRIAXONE 100 MILLIGRAM(S): 500 INJECTION, POWDER, FOR SOLUTION INTRAMUSCULAR; INTRAVENOUS at 04:52

## 2023-05-10 NOTE — ED ADULT NURSE REASSESSMENT NOTE - NS ED NURSE REASSESS COMMENT FT1
Pt rpt feeling better after given ceftriaxone. Denies urinary symptoms at the time. BGL WNL. Gait steady.

## 2023-05-10 NOTE — ED PROVIDER NOTE - CLINICAL SUMMARY MEDICAL DECISION MAKING FREE TEXT BOX
Patient presents with isolated episode of hypoglycemia plan is to check all labs including kidney function electrolytes, urine, CBC.  She appears well she is afebrile we will feed in the emergency department as well as she skipped dinner.  Stable vital signs otherwise.  Patient has no focal symptoms at this time.

## 2023-05-10 NOTE — ED PROVIDER NOTE - PATIENT PORTAL LINK FT
You can access the FollowMyHealth Patient Portal offered by [facility] by registering at the following website: [email or web address]. By joining [facility]’s FollowMyHealth portal, you will also be able to view your health information using other applications (apps) compatible with our system.

## 2023-05-10 NOTE — ED PROVIDER NOTE - NSFOLLOWUPINSTRUCTIONS_ED_ALL_ED_FT
Hypoglycemia  Hypoglycemia occurs when the level of sugar (glucose) in the blood is too low. Hypoglycemia can happen in people who have or do not have diabetes. It can develop quickly, and it can be a medical emergency. For most people, a blood glucose level below 70 mg/dL (3.9 mmol/L) is considered hypoglycemia.    Glucose is a type of sugar that provides the body's main source of energy. Certain hormones (insulin and glucagon) control the level of glucose in the blood. Insulin lowers blood glucose, and glucagon raises blood glucose. Hypoglycemia can result from having too much insulin in the bloodstream, or from not eating enough food that contains glucose. You may also have reactive hypoglycemia, which happens within 4 hours after eating a meal.    What are the causes?  Hypoglycemia occurs most often in people who have diabetes and may be caused by:  Diabetes medicine.  Not eating enough, or not eating often enough.  Increased physical activity.  Drinking alcohol on an empty stomach.  If you do not have diabetes, hypoglycemia may be caused by:  A tumor in the pancreas.  Not eating enough, or not eating for long periods at a time (fasting).  A severe infection or illness.  Problems after having bariatric surgery.  Organ failure, such as kidney or liver failure.  Certain medicines.  What increases the risk?  Hypoglycemia is more likely to develop in people who:  Have diabetes and take medicines to lower blood glucose.  Abuse alcohol.  Have a severe illness.  What are the signs or symptoms?  Symptoms vary depending on whether the condition is mild, moderate, or severe.    Mild hypoglycemia    Hunger.  Sweating and feeling clammy.  Dizziness or feeling light-headed.  Sleepiness or restless sleep.  Nausea.  Increased heart rate.  Headache.  Blurry vision.  Mood changes, such as irritability or anxiety.  Tingling or numbness around the mouth, lips, or tongue.  Moderate hypoglycemia    Confusion and poor judgment.  Behavior changes.  Weakness.  Irregular heartbeat.  A change in coordination.  Severe hypoglycemia    Severe hypoglycemia is a medical emergency. It can cause:  Fainting.  Seizures.  Loss of consciousness (coma).  Death.  How is this diagnosed?  Hypoglycemia is diagnosed with a blood test to measure your blood glucose level. This blood test is done while you are having symptoms. Your health care provider may also do a physical exam and review your medical history.    How is this treated?  This condition can be treated by immediately eating or drinking something that contains sugar with 15 grams of fast-acting carbohydrate, such as:  4 oz (120 mL) of fruit juice.  4 oz (120 mL) of regular soda (not diet soda).  Several pieces of hard candy. Check food labels to find out how many pieces to eat for 15 grams.  1 Tbsp (15 mL) of sugar or honey.  4 glucose tablets.  1 tube of glucose gel.  Treating hypoglycemia if you have diabetes    Hands showing right hand using lancet pen on left ring finger, with glucometer in background.  If you are alert and able to swallow safely, follow the 15:15 rule:  Take 15 grams of a fast-acting carbohydrate. Talk with your health care provider about how much you should take. Options for getting 15 grams of fast-acting carbohydrate include:  Glucose tablets (take 4 tablets).  Several pieces of hard candy. Check food labels to find out how many pieces to eat for 15 grams.  4 oz (120 mL) of fruit juice.  4 oz (120 mL) of regular soda (not diet soda).  1 Tbsp (15 mL) of sugar or honey.  1 tube of glucose gel.  Check your blood glucose 15 minutes after you take the carbohydrate.  If the repeat blood glucose level is still at or below 70 mg/dL (3.9 mmol/L), take 15 grams of a carbohydrate again.  If your blood glucose level does not increase above 70 mg/dL (3.9 mmol/L) after 3 tries, seek emergency medical care.  After your blood glucose level returns to normal, eat a meal or a snack within 1 hour.  Treating severe hypoglycemia    Severe hypoglycemia is when your blood glucose level is below 54 mg/dL (3 mmol/L). Severe hypoglycemia is a medical emergency. Get medical help right away.    If you have severe hypoglycemia and you cannot eat or drink, you will need to be given glucagon. A family member or close friend should learn how to check your blood glucose and how to give you glucagon. Ask your health care provider if you need to have an emergency glucagon kit available.    Severe hypoglycemia may need to be treated in a hospital. The treatment may include getting glucose through an IV. You may also need treatment for the cause of your hypoglycemia.    Follow these instructions at home:  Medical alert bracelet.  General instructions    Take over-the-counter and prescription medicines only as told by your health care provider.  Monitor your blood glucose as told by your health care provider.  If you drink alcohol:  Limit how much you have to:  0–1 drink a day for women who are not pregnant.  0–2 drinks a day for men.  Know how much alcohol is in your drink. In the [place] one drink equals one 12 oz bottle of beer (355 mL), one 5 oz glass of wine (148 mL), or one 1½ oz glass of hard liquor (44 mL).  Be sure to eat food along with drinking alcohol.  Be aware that alcohol is absorbed quickly and may have lingering effects that may result in hypoglycemia later. Be sure to do ongoing glucose monitoring.  Keep all follow-up visits. This is important.  If you have diabetes:    Always have a fast-acting carbohydrate (15 grams) option with you to treat low blood glucose.  Follow your diabetes management plan as directed by your health care provider. Make sure you:  Know the symptoms of hypoglycemia. It is important to treat it right away to prevent it from becoming severe.  Check your blood glucose as often as told. Always check before and after exercise.  Always check your blood glucose before you drive a motorized vehicle.  Take your medicines as told.  Follow your meal plan. Eat on time, and do not skip meals.  Share your diabetes management plan with people in your workplace, school, and household.  Carry a medical alert card or wear medical alert jewelry.  Where to find more information  American Diabetes Association: www.diabetes.org  Contact a health care provider if:  You have problems keeping your blood glucose in your target range.  You have frequent episodes of hypoglycemia.  Get help right away if:  You continue to have hypoglycemia symptoms after eating or drinking something that contains 15 grams of fast-acting carbohydrate, and you cannot get your blood glucose above 70 mg/dL (3.9 mmol/L) while following the 15:15 rule.  Your blood glucose is below 54 mg/dL (3 mmol/L).  You have a seizure.  You faint.  These symptoms may represent a serious problem that is an emergency. Do not wait to see if the symptoms will go away. Get medical help right away. Call your local emergency services (911 in the [place] Do not drive yourself to the hospital.    Summary  Hypoglycemia occurs when the level of sugar (glucose) in the blood is too low.  Hypoglycemia can happen in people who have or do not have diabetes. It can develop quickly, and it can be a medical emergency.  Make sure you know the symptoms of hypoglycemia and how to treat it.  Always have a fast-acting carbohydrate option with you to treat low blood sugar.  This information is not intended to replace advice given to you by your health care provider. Make sure you discuss any questions you have with your health care provider. Urinary Tract Infection    A urinary tract infection (UTI) is an infection of any part of the urinary tract, which includes the kidneys, ureters, bladder, and urethra. Risk factors include ignoring your need to urinate, wiping back to front if female, being an uncircumcised male, and having diabetes or a weak immune system. Symptoms include frequent urination, pain or burning with urination, foul smelling urine, cloudy urine, pain in the lower abdomen, blood in the urine, and fever. If you were prescribed an antibiotic medicine, take it as told by your health care provider. Do not stop taking the antibiotic even if you start to feel better.    SEEK IMMEDIATE MEDICAL CARE IF YOU HAVE ANY OF THE FOLLOWING SYMPTOMS: severe back or abdominal pain, fever, inability to keep fluids or medicine down, dizziness/lightheadedness, or a change in mental status.     Hypoglycemia  Hypoglycemia occurs when the level of sugar (glucose) in the blood is too low. Hypoglycemia can happen in people who have or do not have diabetes. It can develop quickly, and it can be a medical emergency. For most people, a blood glucose level below 70 mg/dL (3.9 mmol/L) is considered hypoglycemia.    Glucose is a type of sugar that provides the body's main source of energy. Certain hormones (insulin and glucagon) control the level of glucose in the blood. Insulin lowers blood glucose, and glucagon raises blood glucose. Hypoglycemia can result from having too much insulin in the bloodstream, or from not eating enough food that contains glucose. You may also have reactive hypoglycemia, which happens within 4 hours after eating a meal.    What are the causes?  Hypoglycemia occurs most often in people who have diabetes and may be caused by:  Diabetes medicine.  Not eating enough, or not eating often enough.  Increased physical activity.  Drinking alcohol on an empty stomach.  If you do not have diabetes, hypoglycemia may be caused by:  A tumor in the pancreas.  Not eating enough, or not eating for long periods at a time (fasting).  A severe infection or illness.  Problems after having bariatric surgery.  Organ failure, such as kidney or liver failure.  Certain medicines.  What increases the risk?  Hypoglycemia is more likely to develop in people who:  Have diabetes and take medicines to lower blood glucose.  Abuse alcohol.  Have a severe illness.  What are the signs or symptoms?  Symptoms vary depending on whether the condition is mild, moderate, or severe.    Mild hypoglycemia    Hunger.  Sweating and feeling clammy.  Dizziness or feeling light-headed.  Sleepiness or restless sleep.  Nausea.  Increased heart rate.  Headache.  Blurry vision.  Mood changes, such as irritability or anxiety.  Tingling or numbness around the mouth, lips, or tongue.  Moderate hypoglycemia    Confusion and poor judgment.  Behavior changes.  Weakness.  Irregular heartbeat.  A change in coordination.  Severe hypoglycemia    Severe hypoglycemia is a medical emergency. It can cause:  Fainting.  Seizures.  Loss of consciousness (coma).  Death.  How is this diagnosed?  Hypoglycemia is diagnosed with a blood test to measure your blood glucose level. This blood test is done while you are having symptoms. Your health care provider may also do a physical exam and review your medical history.    How is this treated?  This condition can be treated by immediately eating or drinking something that contains sugar with 15 grams of fast-acting carbohydrate, such as:  4 oz (120 mL) of fruit juice.  4 oz (120 mL) of regular soda (not diet soda).  Several pieces of hard candy. Check food labels to find out how many pieces to eat for 15 grams.  1 Tbsp (15 mL) of sugar or honey.  4 glucose tablets.  1 tube of glucose gel.  Treating hypoglycemia if you have diabetes    Hands showing right hand using lancet pen on left ring finger, with glucometer in background.  If you are alert and able to swallow safely, follow the 15:15 rule:  Take 15 grams of a fast-acting carbohydrate. Talk with your health care provider about how much you should take. Options for getting 15 grams of fast-acting carbohydrate include:  Glucose tablets (take 4 tablets).  Several pieces of hard candy. Check food labels to find out how many pieces to eat for 15 grams.  4 oz (120 mL) of fruit juice.  4 oz (120 mL) of regular soda (not diet soda).  1 Tbsp (15 mL) of sugar or honey.  1 tube of glucose gel.  Check your blood glucose 15 minutes after you take the carbohydrate.  If the repeat blood glucose level is still at or below 70 mg/dL (3.9 mmol/L), take 15 grams of a carbohydrate again.  If your blood glucose level does not increase above 70 mg/dL (3.9 mmol/L) after 3 tries, seek emergency medical care.  After your blood glucose level returns to normal, eat a meal or a snack within 1 hour.  Treating severe hypoglycemia    Severe hypoglycemia is when your blood glucose level is below 54 mg/dL (3 mmol/L). Severe hypoglycemia is a medical emergency. Get medical help right away.    If you have severe hypoglycemia and you cannot eat or drink, you will need to be given glucagon. A family member or close friend should learn how to check your blood glucose and how to give you glucagon. Ask your health care provider if you need to have an emergency glucagon kit available.    Severe hypoglycemia may need to be treated in a hospital. The treatment may include getting glucose through an IV. You may also need treatment for the cause of your hypoglycemia.    Follow these instructions at home:  Medical alert bracelet.  General instructions    Take over-the-counter and prescription medicines only as told by your health care provider.  Monitor your blood glucose as told by your health care provider.  If you drink alcohol:  Limit how much you have to:  0–1 drink a day for women who are not pregnant.  0–2 drinks a day for men.  Know how much alcohol is in your drink. In the [place] one drink equals one 12 oz bottle of beer (355 mL), one 5 oz glass of wine (148 mL), or one 1½ oz glass of hard liquor (44 mL).  Be sure to eat food along with drinking alcohol.  Be aware that alcohol is absorbed quickly and may have lingering effects that may result in hypoglycemia later. Be sure to do ongoing glucose monitoring.  Keep all follow-up visits. This is important.  If you have diabetes:    Always have a fast-acting carbohydrate (15 grams) option with you to treat low blood glucose.  Follow your diabetes management plan as directed by your health care provider. Make sure you:  Know the symptoms of hypoglycemia. It is important to treat it right away to prevent it from becoming severe.  Check your blood glucose as often as told. Always check before and after exercise.  Always check your blood glucose before you drive a motorized vehicle.  Take your medicines as told.  Follow your meal plan. Eat on time, and do not skip meals.  Share your diabetes management plan with people in your workplace, school, and household.  Carry a medical alert card or wear medical alert jewelry.  Where to find more information  American Diabetes Association: www.diabetes.org  Contact a health care provider if:  You have problems keeping your blood glucose in your target range.  You have frequent episodes of hypoglycemia.  Get help right away if:  You continue to have hypoglycemia symptoms after eating or drinking something that contains 15 grams of fast-acting carbohydrate, and you cannot get your blood glucose above 70 mg/dL (3.9 mmol/L) while following the 15:15 rule.  Your blood glucose is below 54 mg/dL (3 mmol/L).  You have a seizure.  You faint.  These symptoms may represent a serious problem that is an emergency. Do not wait to see if the symptoms will go away. Get medical help right away. Call your local emergency services (911 in the [place] Do not drive yourself to the hospital.    Summary  Hypoglycemia occurs when the level of sugar (glucose) in the blood is too low.  Hypoglycemia can happen in people who have or do not have diabetes. It can develop quickly, and it can be a medical emergency.  Make sure you know the symptoms of hypoglycemia and how to treat it.  Always have a fast-acting carbohydrate option with you to treat low blood sugar.  This information is not intended to replace advice given to you by your health care provider. Make sure you discuss any questions you have with your health care provider.

## 2023-05-10 NOTE — ED PROVIDER NOTE - CARE PLAN
1 Principal Discharge DX:	Hypoglycemia   Principal Discharge DX:	Hypoglycemia  Secondary Diagnosis:	Suspected UTI

## 2023-05-10 NOTE — ED PROVIDER NOTE - PROGRESS NOTE DETAILS
Patient's glucose improved she tolerated p.o. given elevated white blood cell count and leuk esterase and white cells and bacteria in the urine as well as cloudy I will hydrate her give her 24-hour dose of Rocephin, and send a urine culture.  Patient notes that she had 2 episodes of UTI treated with 2 rounds of Macrobid there is a possibility her urine was partially resistant to Macrobid.  We will also send off a culture to confirm sensitivities.  Patient is afebrile and otherwise denies severe UTI symptoms.  She agrees with plan of 1 dose Rocephin and we will call her with culture results.

## 2023-05-10 NOTE — ED PROVIDER NOTE - OBJECTIVE STATEMENT
77-year-old female history of hypertension, insulin-dependent diabetes, on losartan, long-acting insulin 8 units in the evening and sliding scale during the day.  Patient presents with hypoglycemic episode with EMS.  Per patient she has a glucose monitor and her son was trying to talk to her this evening and she does not recall the exact details but 911 was called that she was slow to respond.  Patient found with low glucose in the field and given oral glucose.  Patient notes feeling much better.  Patient notes she skipped dinner and this is likely why her sugars went low.  She denies symptoms of chest pain, shortness of breath, denies symptoms of abdominal pain, nausea vomiting diarrhea, UTI symptoms or fever chills.  She denies recent hospitalizations or illnesses.  She notes non-smoker no alcohol no drugs.  Lives alone with her son.  Fully independent. Denies taking more insulin than usual as she notes that she always checks that she gives herself 8 units in the evening of long-acting insulin 77-year-old female history of hypertension, insulin-dependent diabetes, on losartan, long-acting insulin 8 units in the evening and sliding scale during the day.  Patient presents with hypoglycemic episode with EMS.  Per patient she has a glucose monitor and her son was trying to talk to her this evening and she does not recall the exact details but 911 was called that she was slow to respond.  Patient found with low glucose in the field and given oral glucose.  Patient notes feeling much better.  Patient notes she skipped dinner and this is likely why her sugars went low.  She denies symptoms of chest pain, shortness of breath, denies symptoms of abdominal pain, nausea vomiting diarrhea, UTI symptoms or fever chills. But she does note a recent hemorrhagic cystitis for which she completed 7 days of Macrobid and stopped it a few days ago.  Patient notes she had to complete 2 courses of Macrobid as initially symptoms went away after the first course but then returned so her OB/GYN put her on a second course.  Patient notes her doctor did not do a culture.  Currently has no more symptoms of hematuria dysuria or urinary frequency  She denies recent hospitalizations or illnesses.  She notes non-smoker no alcohol no drugs.  Lives alone with her son.  Fully independent. Denies taking more insulin than usual as she notes that she always checks that she gives herself 8 units in the evening of long-acting insulin

## 2023-05-10 NOTE — ED PROVIDER NOTE - CARDIOVASCULAR NEGATIVE STATEMENT, MLM
"[facility]    Hematology / Oncology Progress Note    Date of Service (when I saw the patient): 06/07/2021     Assessment & Plan   Renny Gannon is a 71 year old male who presents with past medical history of CAD s/p CABG (Jan 2020), HFpEF, CKD3 ( BL Cr. 1.3-1.5), HTN, and JAK2+ myelofibrosis. He was transferred from outside hospital for concern of progression to AML with leukocytosis (WBC = 72.4) on admission. Initiated induction therapy with Decitabine + Venetoclax; (D1= 6/5/21). Venetoclax iniation was delayed until WBC <25K.     Today:  - Day 3 of Decitabine 5 day induction; plan to start Venetoclax once WBC count <25K  - Will give unit of platelets for plt count of 8  - Persistent right shoulder pain; continue Oxycodone 5-10 mg 14h PRN, Tylenol TID  - Started Voltaren QID PRN for right shoulder pain     HEME  # LIVIER 2+ Myelofibrosis   # Concern for AML  Follow by Dr. Cristina. He initially was seen in Dec. 2019 by his PCP for increased fatigue and SOB w/ exertion. CBC showed WBC 79.1, Hgb 11.9 and Plt 378. Peripheral smear showed leuko-erythroblastic reaction w/ neutrophilic left shift. BMBx (12/30/19) hypercellular marrow w/ granulocytic and megakaryocytic proliferation, megakaryocytic clustering and atypia and 1.5% circulating blasts. Consistent w/ myelofibrosis, next generation sequencing positive for LIVIER 2 mutation. He was on and off Hydrea from Jan 2020 - Jan 2021. BMBx 3/26/21 increased fibrosis, cellularity 40-50%, 5% blasts on morphology and flow. He most recently has just been receiving transfusion support for his anemia and thrombocytopenia. He presented to clinic for labs and possible transfusion and was found to have a WBC 71.9 Hgb 6.4 and Plt 18. Lab noted \"a lot of immature WBC and blasts\". He was then directed to the ED for admission and further work up.  - BMBx 6/4, unfortunately only core was obtained. Unable to get aspiration, likely due to fibrosis " and possibly packed with blasts.   - Morph and Flow sent on bone marrow; pending    - Cytogenetics and Molecular sent on peripheral blood; pending   - Flow cytometry on peripheral blood shows 28% myeloid blasts  - Preliminary results indicate likely diagnosis of AML, awaiting further pending results. Discussed diagnosis, treatment options and prognosis with patient and sister on 6/5. Patient agreeable to treatment.  - Plan to start Venetoclax once WBC count <25K. With his significant heart history, he would unlikely be able to tolerate aggressive chemotherapy induction.  - PICC line in place  - Prior auth approved for Venetoclax for this patient. Co pay is $2,882.25. [employer] approved.       Treatment Plan: Decitabine (5 days) / Venetoclax (C1D1: 6/5/21)   - Decitabine 20mg/m2 - D1-D5   - Venetoclax Ramp up - Plan to initiate when WBC <25K   - Supportive meds: PRN anti- emetics, bowl regimen, pain control    # Anemia   # Thrombocytopenia   - Patient is transfusion dependent prior to admission due to previous myelofibrosis  Transfuse to keep hgb> 8 (hx CAD and HFpEF) and platelets >10k      # Risk TLS and DIC   - Renal function greatly improved. Changed allopurinol to 300 mg daily   - Follow TLS labs BID  - Follow DIC labs  daily     ID  COVID test negative 6/2    # ID prophylaxis  - Viral Serologies: HSV 1 positive, HSV 2 negative, EBV positive, HBV Surface Ag negative, HBV surface Ab negative, HBV core Ag negative, HIV negative, CMV negative  -  BID  - Plan to start anti bacterial and anti fungal when ANC <1.0      MSK  # Right Shoulder Pain  # Right shoulder subacromial impingement/bursitis/tendinitis  # Adhesive Capsulitis  Patient has had 2-3 weeks of right shoulder pain. Was seen in ortho clinic on 5/27. Has not had any specific injuries or traumas. Pain is worse with motion but can not get comfortable. Was diagnosed with Right shoulder subacromial impingement/bursitis/tendinitis. Has not been  able to move his shoulder or sleep due to the pain. Received a Kenalog and Marcaine injection on 5/27 in ortho clinic. Received additional injection on 5/30 in ED when he present with worsened pain Differential includes previous diagnosis of right shoulder subacromial impingement/bursitis/tendinitis, fracture, adhesive capsulitis, septic arthritis  - Ortho surg consulted, appreciate recs   - Want to ensure there is no fracture or septic arthritis.    - Repeat right should xray 6/4 shows no acute osseous abnormalities, degenerative changes.    - plan to evaluate patient again on Monday 6/7  - Current Pain Regimen   - Dilaudid 0.2mg q3h prn   - Oxycodone 5-10mg q4h prn   - Tylenol 650mg TID scheduled   - Voltaren Topical QID PRN     CARDS   # CAD s/p CABG (Jan.2020)  # HFpEF   Last saw cardiology 2/16/21. Last echo 1/27/21 show left ventricular function is low normal. EF 50-55%. Apicolateral hypokensis. Possible mid to distal inferolateral hypokinesis.   - Previous on furosemide at home, was previously on lasix 40mg am + 20mg pm. Concerned that his lasix may have induced an KRIS  - Restart lasix 40mg daily     # HTN   - continue PTA Metoprolol      # HLD   - will hold PTA statin while inpatient.      RENAL   # CKD3   - Creatinine significantly improved. May have had lasix induced Cr elevation  - Lasix has been on hole for 1-3 days. Will plan to re-introduce lasix today as patient has had increased BLE edema.  - Continue to monitor with daily CMP     # Bilateral Lower Extremity Edema  - 2-3+ bilateral lower extremity edema  - Chronic history of heart failure and BLE edema  - Was previously on lasix 40mg am + 20mg pm. Concerned that his lasix may have induced an KRIS  - Will start lasix 40mg daily   - Lymphedema consulted  - Was previously wearing compression stockings      Fluids/Electrolytes/Nutrition   - Bolus as needed  - PRN lyte replacement per standing protocol  - Regular diet as tolerated     Lines: PICC in  place    PPX  VTE: none due to thrombocytopenia  GI: n/a  Bowels: Senna and Miralax PRN  Activity: Up as Tolerated    Code  DNR, Intubation OK    Dispo: Plan for prolonged stay, likely +2 weeks. Started induction chemotherapy with decitabine.     Patient is seen and examined by Dr. Murrell.  Assessment and plan are discussed and delivered to the patient.    Milady Gannon PA-C  Hematology/Oncology  Pager #[phone]    Interval History   No acute events overnight. Nursing notes reviewed. Afebrile and HD stable.   Renny is feeling alright this morning. He had difficulty sleeping last night, but this has been a chronic issue. He has not noticed any other side effects from the chemo. Informed him that his platelets were low this morning and we would give a transfusion, patient did not know what platelets were or why the mattered. Provided basic education about platelets. Patient denies obvious signs of active bleeding. He reports eating ok. Continues to have discomfort in his right shoulder. Patient denies questions at this time.   Denies fever, chills, mouth sores, SOB, cough, abdominal pain, diarrhea, constipation, nausea, vomiting, dysuria, hematuria,    Complete and Comprehensive review of systems review and negative other than noted here or in the HPI.     Physical Exam   Temp: 96.1  F (35.6  C) Temp src: Oral BP: 121/55 Pulse: 74   Resp: 18 SpO2: 95 % O2 Device: None (Room air)    Vitals:    06/05/21 1009 06/06/21 0738 06/07/21 0736   Weight: 83.3 kg (183 lb 11.2 oz) 82.6 kg (182 lb 1.6 oz) 84.1 kg (185 lb 4.8 oz)     Vital Signs with Ranges  Temp:  [95.5  F (35.3  C)-96.8  F (36  C)] 96.1  F (35.6  C)  Pulse:  [71-87] 74  Resp:  [18-20] 18  BP: (104-122)/(51-61) 121/55  SpO2:  [93 %-96 %] 95 %  I/O last 3 completed shifts:  In: 1978 [P.O.:1460; I.V.:100; IV Piggyback:118]  Out: 1725 [Urine:1725]    Constitutional: Pleasant male sitting up in chair. Awake and conversational. Non- toxic appearing. No acute  distress.   HEENT: Normocephalic, atraumatic. Sclerae anicteric. EOM intact. Moist mucus membranes   Respiratory: Breathing comfortable with no increased work on room air. Good air exchange. No signs of respiratory distress or accessory muscle use. Lungs clear to auscultation bilaterally, no crackles or wheezing noted.  Cardiovascular: Regular rate and rhythm. Normal S1 and S2. No murmurs, rubs, or gallops. 2-3+ BLE edema  GI: Abdomen is soft, non-distended, non-tender. Bowel sounds present and normoactive.   Skin: Skin is clean, dry, intact. No jaundice appreciated.   Musculoskeletal/ Extremities: No redness, warmth of the joints appreciated. Some swelling to his right shoulder. Pain with movement of his right shoulder.   Neurologic: Alert and oriented. Speech normal. Grossly nonfocal. Memory and thought process preserved. Motor function normal in lower extremities. Right arm range of motion limited. Left arm wnl. Sensation intact.   Neuropsychiatric: Calm, affect congruent to situation. Appropriate mood and affect. Good judgment and insight. No visual/auditory hallucinations.       Medications     - MEDICATION INSTRUCTIONS -         acetaminophen  650 mg Oral TID     acyclovir  400 mg Oral BID     allopurinol  300 mg Oral Daily     vitamin B-12  200 mcg Oral QAM     decitabine  20 mg/m2 (Treatment Plan Recorded) Intravenous Q24H     furosemide  40 mg Oral Daily     heparin lock flush  5-10 mL Intracatheter Q24H     levofloxacin  250 mg Oral At Bedtime     lidocaine  1 patch Transdermal Q24h    And     lidocaine   Transdermal Q8H     metoprolol tartrate  50 mg Oral BID     micafungin  50 mg Intravenous Q24H     vitamin D3  50 mcg Oral Daily       Data   Results for orders placed or performed during the hospital encounter of 06/03/21 (from the past 24 hour(s))   Phosphorus   Result Value Ref Range    Phosphorus 3.8 2.5 - 4.5 mg/dL   Uric acid   Result Value Ref Range    Uric Acid 4.4 3.5 - 7.2 mg/dL   Basic  metabolic panel   Result Value Ref Range    Sodium 136 133 - 144 mmol/L    Potassium 4.6 3.4 - 5.3 mmol/L    Chloride 105 94 - 109 mmol/L    Carbon Dioxide 26 20 - 32 mmol/L    Anion Gap 5 3 - 14 mmol/L    Glucose 207 (H) 70 - 99 mg/dL    Urea Nitrogen 29 7 - 30 mg/dL    Creatinine 1.05 0.66 - 1.25 mg/dL    GFR Estimate 71 >60 mL/min/[1.73_m2]    GFR Estimate If Black 82 >60 mL/min/[1.73_m2]    Calcium 8.0 (L) 8.5 - 10.1 mg/dL   CBC with platelets differential   Result Value Ref Range    WBC 57.1 (HH) 4.0 - 11.0 10e9/L    RBC Count 2.76 (L) 4.4 - 5.9 10e12/L    Hemoglobin 8.0 (L) 13.3 - 17.7 g/dL    Hematocrit 24.5 (L) 40.0 - 53.0 %    MCV 89 78 - 100 fl    MCH 29.0 26.5 - 33.0 pg    MCHC 32.7 31.5 - 36.5 g/dL    RDW 16.6 (H) 10.0 - 15.0 %    Platelet Count 8 (LL) 150 - 450 10e9/L    Diff Method Manual Differential     % Neutrophils 18.9 %    % Lymphocytes 8.3 %    % Monocytes 26.4 %    % Eosinophils 5.8 %    % Basophils 2.5 %    % Metamyelocytes 2.5 %    % Myelocytes 1.7 %    % Promyelocytes 0.8 %    % Blasts 33.1 %    Absolute Neutrophil 10.8 (H) 1.6 - 8.3 10e9/L    Absolute Lymphocytes 4.7 0.8 - 5.3 10e9/L    Absolute Monocytes 15.1 (H) 0.0 - 1.3 10e9/L    Absolute Eosinophils 3.3 (H) 0.0 - 0.7 10e9/L    Absolute Basophils 1.4 (H) 0.0 - 0.2 10e9/L    Absolute Metamyelocytes 1.4 (H) 0 10e9/L    Absolute Myelocytes 1.0 (H) 0 10e9/L    Absolute Promyeloctyes 0.5 (H) 0 10e9/L    Absolute Blasts 18.9 (H) 0 10e9/L    Anisocytosis Slight    INR   Result Value Ref Range    INR 1.22 (H) 0.86 - 1.14   Fibrinogen activity   Result Value Ref Range    Fibrinogen 494 (H) 200 - 420 mg/dL   Magnesium   Result Value Ref Range    Magnesium 2.2 1.6 - 2.3 mg/dL   Lactate Dehydrogenase   Result Value Ref Range    Lactate Dehydrogenase 1,000 (H) 85 - 227 U/L   Phosphorus   Result Value Ref Range    Phosphorus 3.7 2.5 - 4.5 mg/dL   Uric acid   Result Value Ref Range    Uric Acid 4.7 3.5 - 7.2 mg/dL   Basic metabolic panel   Result  Value Ref Range    Sodium 137 133 - 144 mmol/L    Potassium 4.0 3.4 - 5.3 mmol/L    Chloride 106 94 - 109 mmol/L    Carbon Dioxide 27 20 - 32 mmol/L    Anion Gap 4 3 - 14 mmol/L    Glucose 92 70 - 99 mg/dL    Urea Nitrogen 27 7 - 30 mg/dL    Creatinine 1.04 0.66 - 1.25 mg/dL    GFR Estimate 72 >60 mL/min/[1.73_m2]    GFR Estimate If Black 83 >60 mL/min/[1.73_m2]    Calcium 8.3 (L) 8.5 - 10.1 mg/dL   Hepatic panel   Result Value Ref Range    Bilirubin Direct 0.2 0.0 - 0.2 mg/dL    Bilirubin Total 0.4 0.2 - 1.3 mg/dL    Albumin 2.7 (L) 3.4 - 5.0 g/dL    Protein Total 6.3 (L) 6.8 - 8.8 g/dL    Alkaline Phosphatase 178 (H) 40 - 150 U/L    ALT 26 0 - 70 U/L    AST 34 0 - 45 U/L   ABO/Rh type and screen   Result Value Ref Range    ABO PENDING     Antibody Screen PENDING     Test Valid Only At          [facility]    Specimen Expires 06/10/2021    Platelets prepare order unit conditional   Result Value Ref Range    Blood Component Type PLT Pheresis     Units Ordered 1    Blood component   Result Value Ref Range    Unit Number [id number]     Blood Component Type PlateletPheresis LeukoReduced Irradiated     Division Number 00     Status of Unit Released to care unit 06/07/2021 0814     Blood Product Code [id number]     Unit Status ISS      Recent Labs   Lab 06/07/21  0533 06/06/21  1855 06/06/21  0613 06/05/21  0739   WBC 57.1*  --  65.2* 69.3*   HGB 8.0*  --  7.4* 7.9*   MCV 89  --  87 88   PLT 8*  --  11* 12*   INR 1.22*  --  1.32* 1.27*    136 137 137   POTASSIUM 4.0 4.6 4.0 3.7   CHLORIDE 106 105 108 106   CO2 27 26 23 22   BUN 27 29 25 24   CR 1.04 1.05 1.10 1.15   ANIONGAP 4 5 6 9   MINNIE 8.3* 8.0* 8.1* 8.2*   GLC 92 207* 103* 105*   ALBUMIN 2.7*  --  2.7*  --    PROTTOTAL 6.3*  --  6.3*  --    BILITOTAL 0.4  --  0.4  --    ALKPHOS 178*  --  156*  --    ALT 26  --  19  --    AST 34  --  37  --         no chest pain and no edema.

## 2023-05-10 NOTE — ED ADULT TRIAGE NOTE - CHIEF COMPLAINT QUOTE
Pt BIBA c/o hypoglycemia. Pt was AMS on scene, FS 37 initially on arrival. Given glucagon and oral glucose, FS 57 PTA. Pt is awake and alert in triage. States took insulin before sleeping.

## 2023-05-14 LAB
-  AMIKACIN: SIGNIFICANT CHANGE UP
-  AMOXICILLIN/CLAVULANIC ACID: SIGNIFICANT CHANGE UP
-  AMPICILLIN/SULBACTAM: SIGNIFICANT CHANGE UP
-  AMPICILLIN: SIGNIFICANT CHANGE UP
-  AZTREONAM: SIGNIFICANT CHANGE UP
-  CEFAZOLIN: SIGNIFICANT CHANGE UP
-  CEFEPIME: SIGNIFICANT CHANGE UP
-  CEFOXITIN: SIGNIFICANT CHANGE UP
-  CEFTRIAXONE: SIGNIFICANT CHANGE UP
-  CEFUROXIME: SIGNIFICANT CHANGE UP
-  CIPROFLOXACIN: SIGNIFICANT CHANGE UP
-  ERTAPENEM: SIGNIFICANT CHANGE UP
-  GENTAMICIN: SIGNIFICANT CHANGE UP
-  LEVOFLOXACIN: SIGNIFICANT CHANGE UP
-  MEROPENEM: SIGNIFICANT CHANGE UP
-  NITROFURANTOIN: SIGNIFICANT CHANGE UP
-  PIPERACILLIN/TAZOBACTAM: SIGNIFICANT CHANGE UP
-  TOBRAMYCIN: SIGNIFICANT CHANGE UP
-  TRIMETHOPRIM/SULFAMETHOXAZOLE: SIGNIFICANT CHANGE UP
CULTURE RESULTS: SIGNIFICANT CHANGE UP
METHOD TYPE: SIGNIFICANT CHANGE UP
ORGANISM # SPEC MICROSCOPIC CNT: SIGNIFICANT CHANGE UP
ORGANISM # SPEC MICROSCOPIC CNT: SIGNIFICANT CHANGE UP
SPECIMEN SOURCE: SIGNIFICANT CHANGE UP

## 2023-08-13 ENCOUNTER — EMERGENCY (EMERGENCY)
Facility: HOSPITAL | Age: 78
LOS: 1 days | Discharge: HOME | End: 2023-08-13
Attending: EMERGENCY MEDICINE | Admitting: EMERGENCY MEDICINE
Payer: MEDICARE

## 2023-08-13 VITALS
HEART RATE: 78 BPM | SYSTOLIC BLOOD PRESSURE: 136 MMHG | OXYGEN SATURATION: 95 % | TEMPERATURE: 99 F | DIASTOLIC BLOOD PRESSURE: 68 MMHG | RESPIRATION RATE: 18 BRPM | WEIGHT: 128.09 LBS | HEIGHT: 62 IN

## 2023-08-13 DIAGNOSIS — M25.551 PAIN IN RIGHT HIP: ICD-10-CM

## 2023-08-13 DIAGNOSIS — Z88.2 ALLERGY STATUS TO SULFONAMIDES: ICD-10-CM

## 2023-08-13 PROCEDURE — 99285 EMERGENCY DEPT VISIT HI MDM: CPT

## 2023-08-13 PROCEDURE — 73502 X-RAY EXAM HIP UNI 2-3 VIEWS: CPT | Mod: 26,RT

## 2023-08-13 NOTE — ED ADULT NURSE NOTE - CAS EDN DISCHARGE ASSESSMENT
New cane given and pt. ambulating with a steady gait. Returning home with son./Alert and oriented to person, place and time

## 2023-08-13 NOTE — ED ADULT NURSE NOTE - OBJECTIVE STATEMENT
Pt is a 77y female c/o R hip pain/injury. States she was using the ladder to get to her lofted bed and slipped. Denies fall, head strike, blood thinner use, or pain. Presenting with R hip pain only when ambulating, states she is using a cane for support, and bruise to R cheek. No obvious injuries or deformities, skin intact.

## 2023-08-13 NOTE — ED ADULT TRIAGE NOTE - CHIEF COMPLAINT QUOTE
Pt walked into ER c/o slipping down from loft bed last night and having pain to right hip. Pt denies falling to floor, described more as a stumble onto feet. Pt ambulating with cane and able to stand without issue. No further at triage.

## 2023-08-14 VITALS
RESPIRATION RATE: 16 BRPM | OXYGEN SATURATION: 96 % | TEMPERATURE: 98 F | HEART RATE: 64 BPM | SYSTOLIC BLOOD PRESSURE: 122 MMHG | DIASTOLIC BLOOD PRESSURE: 64 MMHG

## 2023-08-14 PROCEDURE — 73700 CT LOWER EXTREMITY W/O DYE: CPT | Mod: 26,RT,MH

## 2023-08-14 PROCEDURE — 72192 CT PELVIS W/O DYE: CPT | Mod: 26,MH

## 2023-08-14 NOTE — ED PROVIDER NOTE - NSFOLLOWUPINSTRUCTIONS_ED_ALL_ED_FT
No fracture seen on XR or CT scan. Please read all handouts provided to you from the emergency department.  Seek immediate medical attention for any new/worsening signs or symptoms.  You may take ibuprofen 600 mg every 6 hours and/or acetaminophen 650 mg every 4-6 hours as needed for pain.     If you need to see an orthopedic surgeon, please contact the offices of the follow physician, or one of your choosing:  Chino Curry MD and associates  Orthopaedic Surgery  [phone]  [address] [place] [address], [address], [place] [address]

## 2023-08-14 NOTE — ED PROVIDER NOTE - CARE PROVIDER_API CALL
Medardo Gonzales  Orthopaedic Surgery  [address], [address]  [place] [address]  Phone: [phone]  Fax: [phone]  Follow Up Time: 4-6 Days

## 2023-08-14 NOTE — ED PROVIDER NOTE - PATIENT PORTAL LINK FT
You can access the FollowMyHealth Patient Portal offered by [facility] by registering at the following website: [email or web address]. By joining [employer]’s FollowMyHealth portal, you will also be able to view your health information using other applications (apps) compatible with our system.

## 2023-08-14 NOTE — ED PROVIDER NOTE - OBJECTIVE STATEMENT
77-year-old female no significant past medical history  Presents 1 day after slipping and landing on her right hip.  Has been able to ambulate with a cane since that time, but is traveling to [place] soon and wanted to be fully evaluated.  Patient has difficulty localizing or characterizing her pain, but states that it is worse with walking and radiates down the front of her leg with ambulation.  Has not taken any medications for pain because she "does not like to take medications".  Denies any other injury during the fall.

## 2023-08-14 NOTE — ED PROVIDER NOTE - CLINICAL SUMMARY MEDICAL DECISION MAKING FREE TEXT BOX
77-year-old female no significant past medical history  Presents 1 day after slipping and landing on her right hip.  Has been able to ambulate with a cane since that time, but is traveling to [place] soon and wanted to be fully evaluated.  Patient has difficulty localizing or characterizing her pain, but states that it is worse with walking and radiates down the front of her leg with ambulation.  Has not taken any medications for pain because she "does not like to take medications".  Denies any other injury during the fall.    PE: Well-appearing, no acute distress, nonlabored respirations clear to auscultation bilaterally, heart regular rate and rhythm, abdomen soft nontender/nondistended.  Right hip with mild tenderness palpation over the greater trochanter and pain with active hip flexion, but no pain with passive hip flexion or internal/external rotation.    MDM: Patient presents after a fall with right hip pain.  Although she has been able to walk on it with a cane it is causing her a lot of pain and she is here for evaluation.  Will obtain x-ray imaging and if patient still having significant pain with negative x-ray will proceed to CT scan to assess for occult fracture.  Patient offered pain medications at this time but refusing.

## 2023-08-14 NOTE — ED PROVIDER NOTE - PHYSICAL EXAMINATION
PE: Well-appearing, no acute distress, nonlabored respirations clear to auscultation bilaterally, heart regular rate and rhythm, abdomen soft nontender/nondistended.  Right hip with mild tenderness palpation over the greater trochanter and pain with active hip flexion, but no pain with passive hip flexion or internal/external rotation.

## 2024-01-25 ENCOUNTER — EMERGENCY (EMERGENCY)
Facility: HOSPITAL | Age: 79
LOS: 1 days | Discharge: HOME | End: 2024-01-25
Attending: EMERGENCY MEDICINE | Admitting: EMERGENCY MEDICINE
Payer: MEDICARE

## 2024-01-25 VITALS
RESPIRATION RATE: 18 BRPM | TEMPERATURE: 98 F | DIASTOLIC BLOOD PRESSURE: 80 MMHG | SYSTOLIC BLOOD PRESSURE: 170 MMHG | HEART RATE: 75 BPM | OXYGEN SATURATION: 98 %

## 2024-01-25 DIAGNOSIS — M25.532 PAIN IN LEFT WRIST: ICD-10-CM

## 2024-01-25 DIAGNOSIS — Z88.2 ALLERGY STATUS TO SULFONAMIDES: ICD-10-CM

## 2024-01-25 PROCEDURE — 99284 EMERGENCY DEPT VISIT MOD MDM: CPT

## 2024-01-25 PROCEDURE — 70450 CT HEAD/BRAIN W/O DYE: CPT | Mod: 26,MH

## 2024-01-25 PROCEDURE — 73110 X-RAY EXAM OF WRIST: CPT | Mod: 26,LT

## 2024-01-25 NOTE — ED PROVIDER NOTE - NSFOLLOWUPINSTRUCTIONS_ED_ALL_ED_FT
There was no fracture (broken bone) seen on your wrist X-ray and your head CT was negative for internal injury, as well. Please read all handouts provided to you from the emergency department.  Seek immediate medical attention for any new/worsening signs or symptoms.  You may take ibuprofen 400 mg every 6 hours and/or acetaminophen 650 mg every 4-6 hours as needed for pain.    To access your record on the patient portal [facility], please visit:  [email or web address]  If you are having difficulties setting this up, call [phone] and someone can assist you over the phone.     Wrist Sprain    WHAT YOU NEED TO KNOW:    A wrist sprain happens when one or more ligaments in your wrist stretch or tear. Ligaments are tough tissues that connect bones and keep them in place, and support your joints.     DISCHARGE INSTRUCTIONS:    Return to the emergency department if:   You have severe pain or swelling.  Your injured wrist is red or has red streaks spreading from the injured area.   You have new trouble moving your hands, fingers, or wrist.  Your wrist, hand, or fingers feel cold or numb.   Your fingernails turn blue or gray.     Contact your healthcare provider if:   Your symptoms get worse.   You have pain and swelling for more than 48 hours.   You have questions or concerns about your condition or care.    Medicines:     NSAIDs, such as ibuprofen, help decrease swelling, pain, and fever. NSAIDs can cause stomach bleeding or kidney problems in certain people. If you take blood thinner medicine, always ask your healthcare provider if NSAIDs are safe for you. Always read the medicine label and follow directions.    Acetaminophen decreases pain and fever. It is available without a doctor's order. Ask how much to take and how often to take it. Follow directions. Read the labels of all other medicines you are using to see if they also contain acetaminophen, or ask your doctor or pharmacist. Acetaminophen can cause liver damage if not taken correctly. Do not use more than 4 grams (4,000 milligrams) total of acetaminophen in one day.     Take your medicine as directed. Contact your healthcare provider if you think your medicine is not helping or if you have side effects. Tell him or her if you are allergic to any medicine. Keep a list of the medicines, vitamins, and herbs you take. Include the amounts, and when and why you take them. Bring the list or the pill bottles to follow-up visits. Carry your medicine list with you in case of an emergency.    Self-care:     Rest your wrist for at least 48 hours. Avoid activities that cause pain.     Ice your wrist for 15 to 20 minutes every hour or as directed. Use an ice pack, or put crushed ice in a plastic bag. Cover it with a towel before you put it on your wrist. Ice helps prevent tissue damage and decreases swelling and pain.    Compress your wrist with an elastic bandage. This will help decrease swelling, support your wrist, and help it heal. Wear your wrist wrap as directed. The elastic bandage should be snug but not tight.    Elevate your wrist above the level of your heart as often as you can. This will help decrease swelling and pain. Prop your wrist on pillows or blankets to keep it elevated comfortably.     Wrist support: You may need to wear a splint or cast to support your wrist and prevent more damage. Wear your splint as directed. Ask for instructions on how to bathe while you are wearing a splint or cast.     Physical therapy: Your healthcare provider may recommend that you go to physical therapy. A physical therapist teaches you exercises to help improve movement and strength, and to decrease pain.    Follow up with your healthcare provider as directed: Write down your questions so you remember to ask them during your visits.        © Copyright [employer] 2019 All illustrations and images included in CareNotes are the copyrighted property of [employer] Inc[employer] or [email or web address]

## 2024-01-25 NOTE — ED ADULT TRIAGE NOTE - CHIEF COMPLAINT QUOTE
walk in pt with complaints of fall this afternoon while walking up a step. Reports pain to left wrist. Denies head injury or loc. BP elevated in triage, denies hx htn.

## 2024-01-25 NOTE — ED PROVIDER NOTE - PATIENT PORTAL LINK FT
You can access the FollowMyHealth Patient Portal offered by [facility] by registering at the following website: [email or web address]. By joining [employer]’s FollowMyHealth portal, you will also be able to view your health information using other applications (apps) compatible with our system.

## 2024-01-25 NOTE — ED PROVIDER NOTE - CLINICAL SUMMARY MEDICAL DECISION MAKING FREE TEXT BOX
78F  Patient presents after a mechanical fall going up the stairs of [facility] earlier this afternoon onto her hands.  Initially, did not have pain anywhere, but as the night went on she began to develop pain in her left wrist.  She attempted to ice, but then decided to come to the emergency department for further evaluation.  She has not taking anything for pain and continues to decline anything.  Denies head strike, headache, vision changes, motor/sensory changes, chest pain, shortness of breath, abdominal pain, nausea/vomiting, anticoagulant use, or any pain in other parts of her body.    Patient A&Ox3, well-appearing, and in no acute distress. Head NCAT. EOM intact and PERRL. Oropharynx with MMM. Heart rate regular, with no murmurs/gallops/clicks/rubs. Breath sounds clear to auscultation bilaterally. Abdomen NTND, soft, with normal bowel sounds. Skin warm and dry. CN II-XII grossly intact. Comprehensive neuro exam intact.   L wrist: tenderness to palpation over the latera and medial wrist without significant swelling or ecchymosis; ROM limited 2/2 pain; SILT    MDM: Patient presents with left wrist pain after mechanical fall earlier in the day concerning for, but not limited to, fracture, strain/sprain, contusion.  Patient is also elderly and is at risk for shearing forces from falling, so will obtain CT head noncontrast.  Have offered pain medications, but patient declines at this time.  Will obtain imaging of her left wrist as well.

## 2024-01-25 NOTE — ED PROVIDER NOTE - PHYSICAL EXAMINATION
Patient A&Ox3, well-appearing, and in no acute distress. Head NCAT. EOM intact and PERRL. Oropharynx with MMM. Heart rate regular, with no murmurs/gallops/clicks/rubs. Breath sounds clear to auscultation bilaterally. Abdomen NTND, soft, with normal bowel sounds. Skin warm and dry. CN II-XII grossly intact. Comprehensive neuro exam intact.   L wrist: tenderness to palpation over the latera and medial wrist without significant swelling or ecchymosis; ROM limited 2/2 pain; SILT

## 2024-01-25 NOTE — ED PROVIDER NOTE - OBJECTIVE STATEMENT
78F  Patient presents after a mechanical fall going up the stairs of [facility] earlier this afternoon onto her hands.  Initially, did not have pain anywhere, but as the night went on she began to develop pain in her left wrist.  She attempted to ice, but then decided to come to the emergency department for further evaluation.  She has not taking anything for pain and continues to decline anything.  Denies head strike, headache, vision changes, motor/sensory changes, chest pain, shortness of breath, abdominal pain, nausea/vomiting, anticoagulant use, or any pain in other parts of her body.

## 2024-11-20 ENCOUNTER — APPOINTMENT (OUTPATIENT)
Dept: ORTHOPEDIC SURGERY | Facility: CLINIC | Age: 79
End: 2024-11-20

## 2024-11-20 VITALS
SYSTOLIC BLOOD PRESSURE: 118 MMHG | HEIGHT: 62 IN | BODY MASS INDEX: 22.08 KG/M2 | WEIGHT: 120 LBS | OXYGEN SATURATION: 98 % | HEART RATE: 77 BPM | TEMPERATURE: 97.8 F | DIASTOLIC BLOOD PRESSURE: 72 MMHG

## 2024-11-20 DIAGNOSIS — M54.16 RADICULOPATHY, LUMBAR REGION: ICD-10-CM

## 2024-11-20 DIAGNOSIS — M48.061 SPINAL STENOSIS, LUMBAR REGION WITHOUT NEUROGENIC CLAUDICATION: ICD-10-CM

## 2024-11-20 DIAGNOSIS — M43.16 SPONDYLOLISTHESIS, LUMBAR REGION: ICD-10-CM

## 2024-11-20 PROCEDURE — 72083 X-RAY EXAM ENTIRE SPI 4/5 VW: CPT

## 2024-11-20 PROCEDURE — 99214 OFFICE O/P EST MOD 30 MIN: CPT

## 2024-12-19 ENCOUNTER — NON-APPOINTMENT (OUTPATIENT)
Age: 79
End: 2024-12-19

## 2024-12-19 ENCOUNTER — APPOINTMENT (OUTPATIENT)
Dept: ORTHOPEDIC SURGERY | Facility: CLINIC | Age: 79
End: 2024-12-19
Payer: MEDICARE

## 2024-12-19 DIAGNOSIS — M43.16 SPONDYLOLISTHESIS, LUMBAR REGION: ICD-10-CM

## 2024-12-19 DIAGNOSIS — M48.061 SPINAL STENOSIS, LUMBAR REGION WITHOUT NEUROGENIC CLAUDICATION: ICD-10-CM

## 2024-12-19 PROCEDURE — 99202 OFFICE O/P NEW SF 15 MIN: CPT | Mod: 57

## 2025-01-29 ENCOUNTER — EMERGENCY (EMERGENCY)
Age: 80
LOS: 1 days | Discharge: HOME | End: 2025-01-29
Admitting: EMERGENCY MEDICINE
Payer: MEDICARE

## 2025-01-29 VITALS
OXYGEN SATURATION: 97 % | RESPIRATION RATE: 18 BRPM | SYSTOLIC BLOOD PRESSURE: 143 MMHG | TEMPERATURE: 98 F | HEART RATE: 82 BPM | DIASTOLIC BLOOD PRESSURE: 81 MMHG

## 2025-01-29 DIAGNOSIS — Z88.2 ALLERGY STATUS TO SULFONAMIDES: ICD-10-CM

## 2025-01-29 DIAGNOSIS — E11.65 TYPE 2 DIABETES MELLITUS WITH HYPERGLYCEMIA: ICD-10-CM

## 2025-01-29 DIAGNOSIS — I10 ESSENTIAL (PRIMARY) HYPERTENSION: ICD-10-CM

## 2025-01-29 DIAGNOSIS — Z79.4 LONG TERM (CURRENT) USE OF INSULIN: ICD-10-CM

## 2025-01-29 PROCEDURE — 99284 EMERGENCY DEPT VISIT MOD MDM: CPT

## 2025-05-07 NOTE — ED PROVIDER NOTE - HIV OFFER
Opt out Marko Calderon  Neurosurgery  [address] [place] [id number]  Phone: [phone]  Fax: [phone]  Follow Up Time:     Susan Diaz  Urology  [address] [facility], [address]  [place] [address]  Phone: [phone]  Fax: [phone]  Follow Up Time: